# Patient Record
Sex: MALE | Race: BLACK OR AFRICAN AMERICAN | Employment: UNEMPLOYED | URBAN - METROPOLITAN AREA
[De-identification: names, ages, dates, MRNs, and addresses within clinical notes are randomized per-mention and may not be internally consistent; named-entity substitution may affect disease eponyms.]

---

## 2017-02-02 ENCOUNTER — OP HISTORICAL/CONVERTED ENCOUNTER (OUTPATIENT)
Dept: OTHER | Age: 62
End: 2017-02-02

## 2017-02-16 ENCOUNTER — OP HISTORICAL/CONVERTED ENCOUNTER (OUTPATIENT)
Dept: OTHER | Age: 62
End: 2017-02-16

## 2017-02-22 ENCOUNTER — ED HISTORICAL/CONVERTED ENCOUNTER (OUTPATIENT)
Dept: OTHER | Age: 62
End: 2017-02-22

## 2017-06-06 ENCOUNTER — OP HISTORICAL/CONVERTED ENCOUNTER (OUTPATIENT)
Dept: OTHER | Age: 62
End: 2017-06-06

## 2017-06-14 ENCOUNTER — OP HISTORICAL/CONVERTED ENCOUNTER (OUTPATIENT)
Dept: OTHER | Age: 62
End: 2017-06-14

## 2017-08-24 ENCOUNTER — OP HISTORICAL/CONVERTED ENCOUNTER (OUTPATIENT)
Dept: OTHER | Age: 62
End: 2017-08-24

## 2017-10-12 ENCOUNTER — OP HISTORICAL/CONVERTED ENCOUNTER (OUTPATIENT)
Dept: OTHER | Age: 62
End: 2017-10-12

## 2017-10-13 ENCOUNTER — OP HISTORICAL/CONVERTED ENCOUNTER (OUTPATIENT)
Dept: OTHER | Age: 62
End: 2017-10-13

## 2017-11-26 ENCOUNTER — IP HISTORICAL/CONVERTED ENCOUNTER (OUTPATIENT)
Dept: OTHER | Age: 62
End: 2017-11-26

## 2017-12-08 ENCOUNTER — OP HISTORICAL/CONVERTED ENCOUNTER (OUTPATIENT)
Dept: OTHER | Age: 62
End: 2017-12-08

## 2017-12-28 ENCOUNTER — OP HISTORICAL/CONVERTED ENCOUNTER (OUTPATIENT)
Dept: OTHER | Age: 62
End: 2017-12-28

## 2017-12-29 ENCOUNTER — IP HISTORICAL/CONVERTED ENCOUNTER (OUTPATIENT)
Dept: OTHER | Age: 62
End: 2017-12-29

## 2018-01-09 ENCOUNTER — OP HISTORICAL/CONVERTED ENCOUNTER (OUTPATIENT)
Dept: OTHER | Age: 63
End: 2018-01-09

## 2018-01-10 ENCOUNTER — ED HISTORICAL/CONVERTED ENCOUNTER (OUTPATIENT)
Dept: OTHER | Age: 63
End: 2018-01-10

## 2018-01-19 ENCOUNTER — OP HISTORICAL/CONVERTED ENCOUNTER (OUTPATIENT)
Dept: OTHER | Age: 63
End: 2018-01-19

## 2018-03-16 ENCOUNTER — OP HISTORICAL/CONVERTED ENCOUNTER (OUTPATIENT)
Dept: OTHER | Age: 63
End: 2018-03-16

## 2018-03-25 ENCOUNTER — OP HISTORICAL/CONVERTED ENCOUNTER (OUTPATIENT)
Dept: OTHER | Age: 63
End: 2018-03-25

## 2018-04-05 ENCOUNTER — OP HISTORICAL/CONVERTED ENCOUNTER (OUTPATIENT)
Dept: OTHER | Age: 63
End: 2018-04-05

## 2018-04-13 ENCOUNTER — OP HISTORICAL/CONVERTED ENCOUNTER (OUTPATIENT)
Dept: OTHER | Age: 63
End: 2018-04-13

## 2018-04-14 ENCOUNTER — ED HISTORICAL/CONVERTED ENCOUNTER (OUTPATIENT)
Dept: OTHER | Age: 63
End: 2018-04-14

## 2018-04-20 ENCOUNTER — OP HISTORICAL/CONVERTED ENCOUNTER (OUTPATIENT)
Dept: OTHER | Age: 63
End: 2018-04-20

## 2018-04-27 ENCOUNTER — OP HISTORICAL/CONVERTED ENCOUNTER (OUTPATIENT)
Dept: OTHER | Age: 63
End: 2018-04-27

## 2018-05-18 ENCOUNTER — OP HISTORICAL/CONVERTED ENCOUNTER (OUTPATIENT)
Dept: OTHER | Age: 63
End: 2018-05-18

## 2018-06-15 ENCOUNTER — IP HISTORICAL/CONVERTED ENCOUNTER (OUTPATIENT)
Dept: OTHER | Age: 63
End: 2018-06-15

## 2018-06-15 ENCOUNTER — OP HISTORICAL/CONVERTED ENCOUNTER (OUTPATIENT)
Dept: OTHER | Age: 63
End: 2018-06-15

## 2018-07-06 ENCOUNTER — OP HISTORICAL/CONVERTED ENCOUNTER (OUTPATIENT)
Dept: OTHER | Age: 63
End: 2018-07-06

## 2018-07-09 ENCOUNTER — OP HISTORICAL/CONVERTED ENCOUNTER (OUTPATIENT)
Dept: OTHER | Age: 63
End: 2018-07-09

## 2018-08-13 ENCOUNTER — OP HISTORICAL/CONVERTED ENCOUNTER (OUTPATIENT)
Dept: OTHER | Age: 63
End: 2018-08-13

## 2018-10-25 ENCOUNTER — IP HISTORICAL/CONVERTED ENCOUNTER (OUTPATIENT)
Dept: OTHER | Age: 63
End: 2018-10-25

## 2019-04-22 ENCOUNTER — ED HISTORICAL/CONVERTED ENCOUNTER (OUTPATIENT)
Dept: OTHER | Age: 64
End: 2019-04-22

## 2019-07-06 ENCOUNTER — ED HISTORICAL/CONVERTED ENCOUNTER (OUTPATIENT)
Dept: OTHER | Age: 64
End: 2019-07-06

## 2020-02-03 ENCOUNTER — ED HISTORICAL/CONVERTED ENCOUNTER (OUTPATIENT)
Dept: OTHER | Age: 65
End: 2020-02-03

## 2020-02-23 ENCOUNTER — IP HISTORICAL/CONVERTED ENCOUNTER (OUTPATIENT)
Dept: OTHER | Age: 65
End: 2020-02-23

## 2020-05-14 ENCOUNTER — ED HISTORICAL/CONVERTED ENCOUNTER (OUTPATIENT)
Dept: OTHER | Age: 65
End: 2020-05-14

## 2020-06-09 ENCOUNTER — ED HISTORICAL/CONVERTED ENCOUNTER (OUTPATIENT)
Dept: OTHER | Age: 65
End: 2020-06-09

## 2021-07-31 ENCOUNTER — APPOINTMENT (OUTPATIENT)
Dept: GENERAL RADIOLOGY | Age: 66
End: 2021-07-31
Attending: HOSPITALIST
Payer: COMMERCIAL

## 2021-07-31 ENCOUNTER — HOSPITAL ENCOUNTER (OUTPATIENT)
Age: 66
Setting detail: OBSERVATION
Discharge: HOME | End: 2021-08-01
Attending: EMERGENCY MEDICINE | Admitting: INTERNAL MEDICINE
Payer: COMMERCIAL

## 2021-07-31 DIAGNOSIS — Z79.01 ANTICOAGULATED ON WARFARIN: ICD-10-CM

## 2021-07-31 DIAGNOSIS — R00.1 AV JUNCTIONAL BRADYCARDIA: Primary | ICD-10-CM

## 2021-07-31 DIAGNOSIS — Z95.2 HISTORY OF MITRAL VALVE REPLACEMENT: ICD-10-CM

## 2021-07-31 LAB
ALBUMIN SERPL-MCNC: 3.2 G/DL (ref 3.5–5)
ALBUMIN/GLOB SERPL: 0.6 {RATIO} (ref 1.1–2.2)
ALP SERPL-CCNC: 80 U/L (ref 45–117)
ALT SERPL-CCNC: 26 U/L (ref 12–78)
ANION GAP SERPL CALC-SCNC: 1 MMOL/L (ref 5–15)
AST SERPL W P-5'-P-CCNC: 29 U/L (ref 15–37)
BASOPHILS # BLD: 0.1 K/UL (ref 0–0.1)
BASOPHILS NFR BLD: 1 % (ref 0–1)
BILIRUB SERPL-MCNC: 0.9 MG/DL (ref 0.2–1)
BUN SERPL-MCNC: 35 MG/DL (ref 6–20)
BUN/CREAT SERPL: 17 (ref 12–20)
CA-I BLD-MCNC: 9 MG/DL (ref 8.5–10.1)
CHLORIDE SERPL-SCNC: 110 MMOL/L (ref 97–108)
CO2 SERPL-SCNC: 28 MMOL/L (ref 21–32)
CREAT SERPL-MCNC: 2.03 MG/DL (ref 0.7–1.3)
DIFFERENTIAL METHOD BLD: ABNORMAL
EOSINOPHIL # BLD: 0.3 K/UL (ref 0–0.4)
EOSINOPHIL NFR BLD: 5 % (ref 0–7)
ERYTHROCYTE [DISTWIDTH] IN BLOOD BY AUTOMATED COUNT: 16 % (ref 11.5–14.5)
GLOBULIN SER CALC-MCNC: 5 G/DL (ref 2–4)
GLUCOSE SERPL-MCNC: 83 MG/DL (ref 65–100)
HCT VFR BLD AUTO: 39.2 % (ref 36.6–50.3)
HGB BLD-MCNC: 11.9 G/DL (ref 12.1–17)
IMM GRANULOCYTES # BLD AUTO: 0 K/UL (ref 0–0.04)
IMM GRANULOCYTES NFR BLD AUTO: 0 % (ref 0–0.5)
INR PPP: 1.9 (ref 0.9–1.1)
LYMPHOCYTES # BLD: 1.1 K/UL (ref 0.8–3.5)
LYMPHOCYTES NFR BLD: 20 % (ref 12–49)
MAGNESIUM SERPL-MCNC: 1.9 MG/DL (ref 1.6–2.4)
MCH RBC QN AUTO: 27.5 PG (ref 26–34)
MCHC RBC AUTO-ENTMCNC: 30.4 G/DL (ref 30–36.5)
MCV RBC AUTO: 90.7 FL (ref 80–99)
MONOCYTES # BLD: 0.6 K/UL (ref 0–1)
MONOCYTES NFR BLD: 12 % (ref 5–13)
NEUTS SEG # BLD: 3.3 K/UL (ref 1.8–8)
NEUTS SEG NFR BLD: 62 % (ref 32–75)
NRBC # BLD: 0 K/UL (ref 0–0.01)
NRBC BLD-RTO: 0 PER 100 WBC
PHOSPHATE SERPL-MCNC: 3 MG/DL (ref 2.6–4.7)
PLATELET # BLD AUTO: 206 K/UL (ref 150–400)
PMV BLD AUTO: 12.6 FL (ref 8.9–12.9)
POTASSIUM SERPL-SCNC: 5.7 MMOL/L (ref 3.5–5.1)
PROT SERPL-MCNC: 8.2 G/DL (ref 6.4–8.2)
PROTHROMBIN TIME: 21.1 SEC (ref 11.9–14.7)
RBC # BLD AUTO: 4.32 M/UL (ref 4.1–5.7)
SODIUM SERPL-SCNC: 139 MMOL/L (ref 136–145)
TROPONIN I SERPL-MCNC: <0.05 NG/ML
TROPONIN I SERPL-MCNC: <0.05 NG/ML
WBC # BLD AUTO: 5.3 K/UL (ref 4.1–11.1)

## 2021-07-31 PROCEDURE — 74011250636 HC RX REV CODE- 250/636: Performed by: HOSPITALIST

## 2021-07-31 PROCEDURE — 73630 X-RAY EXAM OF FOOT: CPT

## 2021-07-31 PROCEDURE — 85610 PROTHROMBIN TIME: CPT

## 2021-07-31 PROCEDURE — 80053 COMPREHEN METABOLIC PANEL: CPT

## 2021-07-31 PROCEDURE — 93005 ELECTROCARDIOGRAM TRACING: CPT

## 2021-07-31 PROCEDURE — 65270000029 HC RM PRIVATE

## 2021-07-31 PROCEDURE — 99285 EMERGENCY DEPT VISIT HI MDM: CPT

## 2021-07-31 PROCEDURE — 36415 COLL VENOUS BLD VENIPUNCTURE: CPT

## 2021-07-31 PROCEDURE — 83735 ASSAY OF MAGNESIUM: CPT

## 2021-07-31 PROCEDURE — 74011250637 HC RX REV CODE- 250/637: Performed by: HOSPITALIST

## 2021-07-31 PROCEDURE — 84484 ASSAY OF TROPONIN QUANT: CPT

## 2021-07-31 PROCEDURE — 99218 HC RM OBSERVATION: CPT

## 2021-07-31 PROCEDURE — 85025 COMPLETE CBC W/AUTO DIFF WBC: CPT

## 2021-07-31 PROCEDURE — 84100 ASSAY OF PHOSPHORUS: CPT

## 2021-07-31 RX ORDER — ATORVASTATIN CALCIUM 40 MG/1
40 TABLET, FILM COATED ORAL
Status: DISCONTINUED | OUTPATIENT
Start: 2021-07-31 | End: 2021-08-01 | Stop reason: HOSPADM

## 2021-07-31 RX ORDER — FOLIC ACID 1 MG/1
1 TABLET ORAL DAILY
Status: DISCONTINUED | OUTPATIENT
Start: 2021-08-01 | End: 2021-08-01 | Stop reason: HOSPADM

## 2021-07-31 RX ORDER — CITALOPRAM 10 MG/1
10 TABLET ORAL AS NEEDED
Status: DISCONTINUED | OUTPATIENT
Start: 2021-07-31 | End: 2021-07-31

## 2021-07-31 RX ORDER — WARFARIN 2.5 MG/1
7.5 TABLET ORAL ONCE
Status: COMPLETED | OUTPATIENT
Start: 2021-07-31 | End: 2021-07-31

## 2021-07-31 RX ORDER — FLUTICASONE PROPIONATE 50 MCG
2 SPRAY, SUSPENSION (ML) NASAL DAILY
Status: DISCONTINUED | OUTPATIENT
Start: 2021-08-01 | End: 2021-08-01 | Stop reason: HOSPADM

## 2021-07-31 RX ORDER — PANTOPRAZOLE SODIUM 40 MG/1
40 TABLET, DELAYED RELEASE ORAL DAILY
Status: DISCONTINUED | OUTPATIENT
Start: 2021-08-01 | End: 2021-08-01 | Stop reason: HOSPADM

## 2021-07-31 RX ORDER — ACETAMINOPHEN 325 MG/1
650 TABLET ORAL
Status: DISCONTINUED | OUTPATIENT
Start: 2021-07-31 | End: 2021-08-01 | Stop reason: HOSPADM

## 2021-07-31 RX ORDER — SODIUM POLYSTYRENE SULFONATE 15 G/60ML
15 SUSPENSION ORAL; RECTAL
Status: COMPLETED | OUTPATIENT
Start: 2021-07-31 | End: 2021-07-31

## 2021-07-31 RX ORDER — ROSUVASTATIN CALCIUM 5 MG/1
20 TABLET, COATED ORAL
Status: DISCONTINUED | OUTPATIENT
Start: 2021-07-31 | End: 2021-07-31 | Stop reason: CLARIF

## 2021-07-31 RX ORDER — SUMATRIPTAN 25 MG/1
100 TABLET, FILM COATED ORAL
Status: DISCONTINUED | OUTPATIENT
Start: 2021-07-31 | End: 2021-08-01 | Stop reason: HOSPADM

## 2021-07-31 RX ADMIN — WARFARIN SODIUM 7.5 MG: 2.5 TABLET ORAL at 19:01

## 2021-07-31 RX ADMIN — SODIUM CHLORIDE 500 ML: 9 INJECTION, SOLUTION INTRAVENOUS at 19:03

## 2021-07-31 RX ADMIN — SODIUM POLYSTYRENE SULFONATE 15 G: 15 SUSPENSION ORAL; RECTAL at 17:29

## 2021-07-31 NOTE — ED TRIAGE NOTES
Per EMS, [facility] and while taking vitals his heart rate was in the 30's. No history of bradycradia. Pt denies any other complaints.

## 2021-07-31 NOTE — ED NOTES
TRANSFER - OUT REPORT:    Verbal report given to [facility] (name) on [facility]  being transferred to [facility](unit) for routine progression of care       Report consisted of patients Situation, Background, Assessment and   Recommendations(SBAR). Information from the following report(s) SBAR was reviewed with the receiving nurse. Lines:   Peripheral IV 07/31/21 Right Antecubital (Active)        Opportunity for questions and clarification was provided.       Patient transported with:   [employer]

## 2021-07-31 NOTE — H&P
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 [facility] and [facility]. He states his Xray results were confusing and there was mixed consensus whether there was a fracture or not    Any how,    [facility] 30s and presented here. EKG shows junctional rhythm, no symptoms of chest pain, shortness of breath, or syncopal episode, or diaphoresis. ER: troponin negative x1, junctional rhythm  Chronically he is on warfarin - d/t to strep IE. Very active life style    Denies any urine changes, diarrhea, headache,     No past medical history on file. Past Surgical History:   Procedure Laterality Date    HX COLONOSCOPY  10/2014    HX ENDOSCOPY  12/1/2014     Family History   Problem Relation Age of Onset    Liver Disease Father       Social History     Tobacco Use    Smoking status: Heavy Tobacco Smoker     Packs/day: 1.50     Years: 15.00     Pack years: 22.50    Smokeless tobacco: Never Used   Substance Use Topics    Alcohol use: Yes     Alcohol/week: 4.2 standard drinks     Types: 5 Cans of beer per week       Prior to Admission medications    Medication Sig Start Date End Date Taking? Authorizing Provider   warfarin (COUMADIN) 1 mg tablet Take 1 tab daily, except 2 tabs on Tuesday. Must make appt, no refills. 7/8/15   Aura Vaz MD   thiamine (B-1) 100 mg tablet Take 1 tablet by mouth daily. For supplementation 1/12/15   Aura Vaz MD   folic acid (FOLVITE) 1 mg tablet Take 1 tablet by mouth daily. For supplementation 1/12/15   Pa Vaz MD   pantoprazole (PROTONIX) 40 mg tablet Take 40 mg by mouth daily. Provider, Historical   valsartan (DIOVAN) 160 mg tablet Take 1 tablet by mouth daily. S/p valve replacement 1/2/15   Pa Vaz MD   metoprolol succinate (TOPROL-XL) 25 mg XL tablet Take 1 tablet by mouth daily. S/p valve replacement 1/2/15   Pa Vaz MD   omeprazole (PRILOSEC) 20 mg capsule Take 1 capsule by mouth daily. For stomach acid 12/31/14   Pa Vaz MD   rosuvastatin (CRESTOR) 10 mg tablet Take 20 mg by mouth nightly. Provider, Historical   SUMAtriptan (IMITREX) 100 mg tablet Take 100 mg by mouth once as needed for Migraine. Provider, Historical   citalopram (CELEXA) 10 mg tablet Take  by mouth as needed. Provider, Historical   fluticasone (FLONASE) 50 mcg/actuation nasal spray Use two sprays in each nostril once daily for allergies. 12/30/14   Pa Vaz MD     No Known Allergies          Review of Systems:  Constitutional: Appetite is good, denies weight loss, no fever, no chills, no night sweats  Eye: No recent visual disturbances, no discharge, no double vision  Ear/nose/mouth/throat : No hearing disturbance, no ear pain, no nasal congestion, no sore throat, no trouble swallowing. Respiratory : No trouble breathing, no cough, no shortness of breath, no hemoptysis, no wheezing  Cardiovascular : No chest pain, no palpitation, no racing of heart, no orthopnea, no paroxysmal nocturnal dyspnea, no peripheral edema  Gastrointestinal : No nausea, no vomiting, no diarrhea, constipation, heartburn, abdominal pain  Genitourinary : No dysuria, no hematuria, no increased frequency, incontinence,  Lymphatics : No swollen glands -Neck, axillary, inguinal  Endocrine : No excessive thirst, no polyuria no cold intolerance, no heat intolerance.   Immunologic : No hives, urticaria, no seasonal allergies,   Musculoskeletal : L- foot pain  Integumentary : No rash, no pruritus, no ecchymosis  Hematology : No petechiae, No easy bruising,  No tendency to bleed easy  Neurology : Denies change in mental status, no abnormal balance, no headache, no confusion, numbness, tingling,  Psychiatric : No mood swings, no anxiety, depression    Vitals:     Visit Vitals  /80 (BP 1 Location: Left upper arm, BP Patient Position: At rest)   Pulse (!) 41   Temp 97.7 °F (36.5 °C)   Resp 16   Ht 5' 7\" (1.702 m)   Wt 94.8 kg (209 lb)   SpO2 100%   BMI 32.73 kg/m²       Physical Exam:   General : Appearance, looks comfortable, no respiratory distress noted  HEENT : PERRLA, normal oral mucosa, atraumatic normocephalic, Normal ear and nose. oropharynx  Neck : Supple, no JVD, no masses noted, no carotid bruit  Lungs : Breath sounds with good air entry bilaterally, no wheezes or rales, no accessory muscle use,  CVS : bradycardia  Abdomen : Soft, nontender, no organomegaly, bowel sounds active  Extremities : No edema noted,  pedal pulses palpable  Musculoskeletal : Fair range of motion, no joint swelling or effusion, muscle tone and power appears normal,   Skin : Moist, warm, skin turgor, no pathological rash  Lymphatic : No cervical lymphadenopathy. Neurological : Awake, alert, oriented to time place person. Cranial nerves intact, deep tendon reflexes active, no sensory disturbance, no cerebellar deficits. Psychiatric : Mood and affect appears appropriate to the situation.          Data Review:   Recent Results (from the past 24 hour(s))   CBC WITH AUTOMATED DIFF    Collection Time: 07/31/21 11:03 AM   Result Value Ref Range    WBC 5.3 4.1 - 11.1 K/uL    RBC 4.32 4.10 - 5.70 M/uL    HGB 11.9 (L) 12.1 - 17.0 g/dL    HCT 39.2 36.6 - 50.3 %    MCV 90.7 80.0 - 99.0 FL    MCH 27.5 26.0 - 34.0 PG    MCHC 30.4 30.0 - 36.5 g/dL    RDW 16.0 (H) 11.5 - 14.5 %    PLATELET 958 422 - 698 K/uL    MPV 12.6 8.9 - 12.9 FL    NRBC 0.0 0.0  WBC    ABSOLUTE NRBC 0.00 0.00 - 0.01 K/uL    NEUTROPHILS 62 32 - 75 %    LYMPHOCYTES 20 12 - 49 %    MONOCYTES 12 5 - 13 %    EOSINOPHILS 5 0 - 7 %    BASOPHILS 1 0 - 1 %    IMMATURE GRANULOCYTES 0 0 - 0.5 %    ABS. NEUTROPHILS 3.3 1.8 - 8.0 K/UL    ABS. LYMPHOCYTES 1.1 0.8 - 3.5 K/UL    ABS. MONOCYTES 0.6 0.0 - 1.0 K/UL    ABS. EOSINOPHILS 0.3 0.0 - 0.4 K/UL    ABS. BASOPHILS 0.1 0.0 - 0.1 K/UL    ABS. IMM. GRANS. 0.0 0.00 - 0.04 K/UL    DF AUTOMATED     MAGNESIUM    Collection Time: 07/31/21 12:33 PM   Result Value Ref Range    Magnesium 1.9 1.6 - 2.4 mg/dL   METABOLIC PANEL, COMPREHENSIVE    Collection Time: 07/31/21 12:33 PM   Result Value Ref Range    Sodium 139 136 - 145 mmol/L    Potassium 5.7 (H) 3.5 - 5.1 mmol/L    Chloride 110 (H) 97 - 108 mmol/L    CO2 28 21 - 32 mmol/L    Anion gap 1 (L) 5 - 15 mmol/L    Glucose 83 65 - 100 mg/dL    BUN 35 (H) 6 - 20 mg/dL    Creatinine 2.03 (H) 0.70 - 1.30 mg/dL    BUN/Creatinine ratio 17 12 - 20      GFR est AA 40 (L) >60 ml/min/1.73m2    GFR est non-AA 33 (L) >60 ml/min/1.73m2    Calcium 9.0 8.5 - 10.1 mg/dL    Bilirubin, total 0.9 0.2 - 1.0 mg/dL    AST (SGOT) 29 15 - 37 U/L    ALT (SGPT) 26 12 - 78 U/L    Alk. phosphatase 80 45 - 117 U/L    Protein, total 8.2 6.4 - 8.2 g/dL    Albumin 3.2 (L) 3.5 - 5.0 g/dL    Globulin 5.0 (H) 2.0 - 4.0 g/dL    A-G Ratio 0.6 (L) 1.1 - 2.2     PHOSPHORUS    Collection Time: 07/31/21 12:33 PM   Result Value Ref Range    Phosphorus 3.0 2.6 - 4.7 mg/dL   TROPONIN I    Collection Time: 07/31/21 12:33 PM   Result Value Ref Range    Troponin-I, Qt. <0.05 <0.05 ng/mL             Assessment and Plan :     (1) bradycardia: troponin -ve x1, EKG junctional rhythm, hold metoprolol and valsartan, IVF bolus and k-xylate one time, PRN atropine as standby. ECHO for akinesia and cardiology consult    (2) MVR d/t strep IE on warfarin: complicates #1    (3) hyperkalemia: likely dehydration with ACE use.  See #1    (4) MONIKA: see #1    (5) GERD: protonix    (6) HLD: statin    (7) left greater toe pain s/t injury: XR    DVT ppx: warfarin    DISPO: home after cardiology in [place].       Signed By: Luann Liang MD     July 31, 2021

## 2021-08-01 ENCOUNTER — APPOINTMENT (OUTPATIENT)
Dept: NON INVASIVE DIAGNOSTICS | Age: 66
End: 2021-08-01
Attending: HOSPITALIST
Payer: COMMERCIAL

## 2021-08-01 VITALS
HEART RATE: 52 BPM | BODY MASS INDEX: 32.8 KG/M2 | SYSTOLIC BLOOD PRESSURE: 155 MMHG | TEMPERATURE: 97.6 F | RESPIRATION RATE: 18 BRPM | DIASTOLIC BLOOD PRESSURE: 62 MMHG | HEIGHT: 67 IN | OXYGEN SATURATION: 99 % | WEIGHT: 209 LBS

## 2021-08-01 LAB
ANION GAP SERPL CALC-SCNC: 1 MMOL/L (ref 5–15)
ATRIAL RATE: 312 BPM
ATRIAL RATE: 47 BPM
BUN SERPL-MCNC: 37 MG/DL (ref 6–20)
BUN/CREAT SERPL: 22 (ref 12–20)
CA-I BLD-MCNC: 8.8 MG/DL (ref 8.5–10.1)
CALCULATED R AXIS, ECG10: -55 DEGREES
CALCULATED R AXIS, ECG10: 3 DEGREES
CALCULATED T AXIS, ECG11: 39 DEGREES
CALCULATED T AXIS, ECG11: 73 DEGREES
CHLORIDE SERPL-SCNC: 110 MMOL/L (ref 97–108)
CO2 SERPL-SCNC: 27 MMOL/L (ref 21–32)
CREAT SERPL-MCNC: 1.7 MG/DL (ref 0.7–1.3)
DIAGNOSIS, 93000: NORMAL
DIAGNOSIS, 93000: NORMAL
ECHO AO ROOT DIAM: 3.4 CM
ECHO AR MAX VEL PISA: 323 CM/S
ECHO AV AREA PEAK VELOCITY: 2.16 CM2
ECHO AV AREA VTI: 2.1 CM2
ECHO AV AREA/BSA PEAK VELOCITY: 1 CM2/M2
ECHO AV AREA/BSA VTI: 1 CM2/M2
ECHO AV MEAN GRADIENT: 4 MMHG
ECHO AV MEAN VELOCITY: 95.2 CM/S
ECHO AV PEAK GRADIENT: 11 MMHG
ECHO AV PEAK VELOCITY: 164 CM/S
ECHO AV REGURGITANT PHT: 513 MS
ECHO AV VTI: 30.3 CM
ECHO LA AREA 4C: 42.55 CM2
ECHO LA MAJOR AXIS: 5.8 CM
ECHO LA MINOR AXIS: 2.82 CM
ECHO LA TO AORTIC ROOT RATIO: 1.71
ECHO LV EDV A2C: 96.1 CM3
ECHO LV ESV A2C: 26.5 CM3
ECHO LV INTERNAL DIMENSION DIASTOLIC: 4.58 CM (ref 4.2–5.9)
ECHO LV INTERNAL DIMENSION SYSTOLIC: 2.98 CM
ECHO LV IVSD: 1.15 CM (ref 0.6–1)
ECHO LV MASS 2D: 189.3 G (ref 88–224)
ECHO LV MASS INDEX 2D: 91.9 G/M2 (ref 49–115)
ECHO LV POSTERIOR WALL DIASTOLIC: 1.13 CM (ref 0.6–1)
ECHO LVOT DIAM: 1.9 CM
ECHO LVOT PEAK GRADIENT: 6 MMHG
ECHO LVOT PEAK VELOCITY: 125 CM/S
ECHO LVOT SV: 64 CM3
ECHO LVOT VTI: 22.4 CM
ECHO LVOT VTI: 22.4 CM
ECHO MAIN PULMONARY ARTERY DIAMETER: 2.7 CM
ECHO MV E DECELERATION TIME (DT): 158 MS
ECHO MV E VELOCITY: 205 CM/S
ECHO MV MAX VELOCITY: 214 CM/S
ECHO MV MEAN GRADIENT: 6 MMHG
ECHO MV PEAK GRADIENT: 18 MMHG
ECHO MV VTI: 40.4 CM
ECHO PV MAX VELOCITY: 95.4 CM/S
ECHO PV PEAK INSTANTANEOUS GRADIENT SYSTOLIC: 4 MMHG
ECHO PV PEAK INSTANTANEOUS GRADIENT SYSTOLIC: 5 MMHG
ECHO PV REGURGITANT MAX VELOCITY: 115 CM/S
ECHO RA AREA 4C: 28.44 CM2
ECHO RV INTERNAL DIMENSION: 4.45 CM
ECHO RV TAPSE: 1.7 CM (ref 1.5–2)
ECHO TV MAX VELOCITY: 326 CM/S
ECHO TV REGURGITANT PEAK GRADIENT: 43 MMHG
GLUCOSE SERPL-MCNC: 100 MG/DL (ref 65–100)
INR PPP: 2.2 (ref 0.9–1.1)
LVOT MG: 2 MMHG
POTASSIUM SERPL-SCNC: 4.2 MMOL/L (ref 3.5–5.1)
PROTHROMBIN TIME: 23.9 SEC (ref 11.9–14.7)
Q-T INTERVAL, ECG07: 476 MS
Q-T INTERVAL, ECG07: 500 MS
QRS DURATION, ECG06: 104 MS
QRS DURATION, ECG06: 96 MS
QTC CALCULATION (BEZET), ECG08: 402 MS
QTC CALCULATION (BEZET), ECG08: 429 MS
SODIUM SERPL-SCNC: 138 MMOL/L (ref 136–145)
VENTRICULAR RATE, ECG03: 39 BPM
VENTRICULAR RATE, ECG03: 49 BPM

## 2021-08-01 PROCEDURE — 85610 PROTHROMBIN TIME: CPT

## 2021-08-01 PROCEDURE — 80048 BASIC METABOLIC PNL TOTAL CA: CPT

## 2021-08-01 PROCEDURE — 74011250637 HC RX REV CODE- 250/637: Performed by: HOSPITALIST

## 2021-08-01 PROCEDURE — 36415 COLL VENOUS BLD VENIPUNCTURE: CPT

## 2021-08-01 PROCEDURE — 99218 HC RM OBSERVATION: CPT

## 2021-08-01 PROCEDURE — 93005 ELECTROCARDIOGRAM TRACING: CPT

## 2021-08-01 PROCEDURE — 93306 TTE W/DOPPLER COMPLETE: CPT

## 2021-08-01 RX ORDER — NAPROXEN 500 MG/1
500 TABLET ORAL DAILY
COMMUNITY

## 2021-08-01 RX ORDER — WARFARIN 7.5 MG/1
7.5 TABLET ORAL
COMMUNITY

## 2021-08-01 RX ORDER — FLUTICASONE PROPIONATE AND SALMETEROL 250; 50 UG/1; UG/1
2 POWDER RESPIRATORY (INHALATION) DAILY
COMMUNITY

## 2021-08-01 RX ORDER — SPIRONOLACTONE 25 MG/1
25 TABLET ORAL DAILY
COMMUNITY

## 2021-08-01 RX ADMIN — FOLIC ACID 1 MG: 1 TABLET ORAL at 11:31

## 2021-08-01 RX ADMIN — ATORVASTATIN CALCIUM 40 MG: 40 TABLET, FILM COATED ORAL at 00:54

## 2021-08-01 RX ADMIN — PANTOPRAZOLE SODIUM 40 MG: 40 TABLET, DELAYED RELEASE ORAL at 11:31

## 2021-08-01 NOTE — CONSULTS
[facility] CARDIOLOGY  CARDIOLOGY CONSULTATION    REASON FOR CONSULT:  bradycardia    REQUESTING PROVIDER:  See chart    CHIEF COMPLAINT:  Low HR    HISTORY OF PRESENT ILLNESS:    65M with h/o infective endocarditis s/p mechanical MVR on Coumadin, former smoker, HTN, CKD 3GERD, who presented after stubbing hit toe sliding into base while playing baseball. He works as a cook at [employer]. In the ER he was found to be bradycardiac to high 30s. Blood pressure wnl. He denies symptoms of fatigue, lightheadedness, syncope, or pre-syncope. He has no h/o cp, sob, orthopnea. Pnd, or le swelling. He is fairly active and has no issues with activity. He does not feel palpitations. He has no bleeding issues. ROS is otherwise negative. Records from hospital admission course thus far reviewed. Since admission, patient has had ECG and TTE, see below. PAST MEDICAL HISTORY:  Notes above. Relevant cardiac issues: see above    HOME MEDICATIONS:  Home medications reviewed, no side effects. ALLERGIES:  Allergies reviewed with the patient,JACKIE. FAMILY HISTORY:  Family history reviewed, no premature cardiac disease. SOCIAL HISTORY:  Notable for prior 22pack year tobacco use, no heavy alcohol or illicit drug use. REVIEW OF SYSTEMS:  Complete review of systems performed, pertinents noted above, all other systems are negative. PHYSICAL EXAMINATION:    Visit Vitals  BP (!) 155/62 (BP 1 Location: Left upper arm, BP Patient Position: At rest;Supine)   Pulse (!) 52   Temp 97.6 °F (36.4 °C)   Resp 18   Ht 5' 7\" (1.702 m)   Wt 94.8 kg (209 lb)   SpO2 99%   BMI 32.73 kg/m²     Irregular rhythm, mild bradycardia which increases with activity  Mechanical S1, no rubs, no gallops  CTAB  Soft nt nd  No le edema, 2+ pulses    Electrocardiogram performed earlier reviewed, it shows Atrial fibrillation, Rate high 40s    Recent labs results and imaging reviewed.   Notable findings include  INR 2.2  Cr 1.7, GFR 50    TTE  · LV: Estimated LVEF is 60 - 65%. Normal cavity size, wall thickness and systolic function (ejection fraction normal). · MV: Mitral valve is prosthetic. Mitral valve mean gradient is 6 mmHg. · PV: Mild pulmonic valve regurgitation is present. · LA: Severely dilated left atrium. · RA: Mildly dilated right atrium. · TV: Right Ventricular Arterial Pressure (RVSP) is 43 mmHg. · RV: Mildly dilated right ventricle. · AV: Mild aortic valve regurgitation is present. IMPRESSION AND RECOMMENDATIONS:  1. H/o MVR  2. Bradycardia  3. Atrial fibrillation  4. Hypertension    -Asymptomatic bradycardia. No evidence of chronotropic incompetance. - marco new onset Atrial fibrillation although douby given h/o MVR and severely dilated Left atrium  - Stop Metoprolol, follow up as outpatient with primary cardiologist for 14 day Zio patch or event monitor  - Cont Coumadin, goal INR 2.5-3.5 given mechanical MVR  - no further workup indicated as an inpatient  - Add Amlodipine for blood pressure control    Thank you for involving us in the care of this patient. Please do not hesitate to call if additional questions arise.     Jessica Renae MD, Charissa Rose, [facility]  Structural Heart Disease  Endovascular and Vascular Medicine  Interventional Cardiology  [facility]  [phone]

## 2021-08-01 NOTE — DISCHARGE SUMMARY
Hospitalist Discharge Summary     Patient ID:  Adelaide Boyle  [id number]  13 y.o.  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 [facility] and unna boot. He states his Xray results were confusing and there was mixed consensus whether there was a fracture or not     Any how,     [facility] was 30s and presented here. EKG shows junctional rhythm, no symptoms of chest pain, shortness of breath, or syncopal episode, or diaphoresis. ER: troponin negative x1, junctional rhythm  Chronically he is on warfarin - d/t to strep IE. Very active life style     Denies any urine changes, diarrhea, headache,        ______________________________________________________________________  DISCHARGE SUMMARY/HOSPITAL COURSE:  for full details see H&P, daily progress notes, labs, consult notes.    Patient was subsequently admitted to [facility] for further evaluation as well as management, patient remained on continuous telemetry monitoring, patient's beta-blockers were held, patient symptoms resolved, patient's heart rate normalized, patient was evaluated by cardiology and after cardiology clearance, patient was deemed stable for discharge with close outpatient follow-up with patient's own primary care physician as well as cardiologist, the plan was explained to the patient in detail who is agreeable to current plan.          _______________________________________________________________________  Patient seen and examined by me on discharge day. Pertinent Findings:  Gen:    Not in distress  Chest: Clear lungs  CVS:   Regular rhythm, s1/s2 no m/r/g  No edema  Abd:  Soft, not distended, not tender  Neuro:  Alert, Oriented x 4  _______________________________________________________________________  DISCHARGE MEDICATIONS:   Current Discharge Medication List      CONTINUE these medications which have NOT CHANGED    Details   !! warfarin (Coumadin) 7.5 mg tablet Take 7.5 mg by mouth every Tuesday, Thursday, Saturday & Sunday. fluticasone propion-salmeteroL (ADVAIR/WIXELA) 250-50 mcg/dose diskus inhaler Take 2 Puffs by inhalation daily. naproxen (Naprosyn) 500 mg tablet Take 500 mg by mouth daily. spironolactone (ALDACTONE) 25 mg tablet Take 25 mg by mouth daily. rosuvastatin (CRESTOR) 10 mg tablet Take 20 mg by mouth nightly. fluticasone (FLONASE) 50 mcg/actuation nasal spray Use two sprays in each nostril once daily for allergies. Qty: 1 Bottle, Refills: 2    Associated Diagnoses: Allergic rhinitis      !! warfarin (COUMADIN) 1 mg tablet Take 1 tab daily, except 2 tabs on Tuesday. Must make appt, no refills. Qty: 15 Tab, Refills: 0      thiamine (B-1) 100 mg tablet Take 1 tablet by mouth daily. For supplementation  Qty: 30 tablet, Refills: 11    Associated Diagnoses: Alcoholism /alcohol abuse      folic acid (FOLVITE) 1 mg tablet Take 1 tablet by mouth daily. For supplementation  Qty: 30 tablet, Refills: 11    Associated Diagnoses: Alcoholism /alcohol abuse      pantoprazole (PROTONIX) 40 mg tablet Take 40 mg by mouth daily. valsartan (DIOVAN) 160 mg tablet Take 1 tablet by mouth daily.  S/p valve replacement  Qty: 90 tablet, Refills: 1    Associated Diagnoses: H/O mitral valve replacement      SUMAtriptan (IMITREX) 100 mg tablet Take 100 mg by mouth once as needed for Migraine. citalopram (CELEXA) 10 mg tablet Take  by mouth as needed. !! - Potential duplicate medications found. Please discuss with provider. STOP taking these medications       metoprolol succinate (TOPROL-XL) 25 mg XL tablet Comments:   Reason for Stopping:         omeprazole (PRILOSEC) 20 mg capsule Comments:   Reason for Stopping:                 Patient Follow Up Instructions: Activity: Activity as tolerated  Diet: Cardiac Diet  Wound Care: As directed    Follow-up with PCP/Cardiology in 2 weeks. Follow-up tests/labs As per above physicians  Follow-up Information     Follow up With Specialties Details Why Contact Froilan Mcgill MD [place] [phone]          ________________________________________________________________    Risk of deterioration: Low    Condition at Discharge:  Stable  __________________________________________________________________    Disposition  Home with family, no needs    ____________________________________________________________________    Code Status: Full Code  ___________________________________________________________________      Total time in minutes spent coordinating this discharge (includes going over instructions, follow-up, prescriptions, and preparing report for sign off to her PCP) :  45 minutes    Signed:   Svitlana Chapman MD

## 2021-08-01 NOTE — PROGRESS NOTES
Pharmacy Dosing Services:      Day of Therapy 02  Dose to achieve an INR goal of 2.5- 3.5     INR today is 2.2     Order entered for  Warfarin  7.5 (mg) ordered to be given today at 18:00.

## 2021-08-01 NOTE — PROGRESS NOTES
Problem: Falls - Risk of  Goal: *Absence of Falls  Description: Document Murray Kristeno Fall Risk and appropriate interventions in the flowsheet.   Outcome: Progressing Towards Goal  Note: Fall Risk Interventions:            Medication Interventions: Teach patient to arise slowly                   Problem: Patient Education: Go to Patient Education Activity  Goal: Patient/Family Education  Outcome: Progressing Towards Goal

## 2021-08-08 NOTE — ED PROVIDER NOTES
EMERGENCY DEPARTMENT HISTORY AND PHYSICAL EXAM        Date: 7/31/2021  Patient Name: Burton Merchant    History of Presenting Illness     Chief Complaint   Patient presents with    Slow Heart Rate       3:01 PM    History Provided By: [facility] and [facility]    HPI: Burton Merchant, 61 y.o. male with a history of mitral valve replacement (metallic) on warfarin, HTN and migraine headaches presents to the ED and referral from [facility] secondary to new onset bradycardia at check-in. Patient was found to have a heart rate of 35 on check in vital signs which was confirmed on EKG is a heart rate of 39. Patient states that he has had no symptoms at all today and was quite pleasant this finding. Patient was being seen in follow-up for assessment of a possible foot fracture and a placed on a rebreather chest x-ray done there a few days ago. Patient denies any lightheadedness, dizziness, syncope/near syncope, chest pain, shortness of breath, nausea/vomiting/diarrhea, medication excess, medication changes. PCP: Chato Sotelo MD    Current Outpatient Medications   Medication Sig Dispense Refill    warfarin (Coumadin) 7.5 mg tablet Take 7.5 mg by mouth every Tuesday, Thursday, Saturday & Sunday.  fluticasone propion-salmeteroL (ADVAIR/WIXELA) 250-50 mcg/dose diskus inhaler Take 2 Puffs by inhalation daily.  naproxen (Naprosyn) 500 mg tablet Take 500 mg by mouth daily.  spironolactone (ALDACTONE) 25 mg tablet Take 25 mg by mouth daily.  rosuvastatin (CRESTOR) 10 mg tablet Take 20 mg by mouth nightly.  fluticasone (FLONASE) 50 mcg/actuation nasal spray Use two sprays in each nostril once daily for allergies. 1 Bottle 2    warfarin (COUMADIN) 1 mg tablet Take 1 tab daily, except 2 tabs on Tuesday. Must make appt, no refills.  (Patient taking differently: 2.5 mg every Monday, Wednesday, Friday.) 15 Tab 0    thiamine (B-1) 100 mg tablet Take 1 tablet by mouth daily. For supplementation (Patient not taking: Reported on 8/1/2021) 30 tablet 11    folic acid (FOLVITE) 1 mg tablet Take 1 tablet by mouth daily. For supplementation (Patient not taking: Reported on 8/1/2021) 30 tablet 11    pantoprazole (PROTONIX) 40 mg tablet Take 40 mg by mouth daily. (Patient not taking: Reported on 8/1/2021)      valsartan (DIOVAN) 160 mg tablet Take 1 tablet by mouth daily. S/p valve replacement (Patient not taking: Reported on 8/1/2021) 90 tablet 1    SUMAtriptan (IMITREX) 100 mg tablet Take 100 mg by mouth once as needed for Migraine. (Patient not taking: Reported on 8/1/2021)      citalopram (CELEXA) 10 mg tablet Take  by mouth as needed. (Patient not taking: Reported on 8/1/2021)         Past History     Past Medical History:  Past Medical History:   Diagnosis Date    Ill-defined condition     Mitral valve replaced 2006       Past Surgical History:  Past Surgical History:   Procedure Laterality Date    HX COLONOSCOPY  10/2014    HX ENDOSCOPY  12/1/2014       Family History:  Family History   Problem Relation Age of Onset    Liver Disease Father        Social History:  Social History     Tobacco Use    Smoking status: Heavy Tobacco Smoker     Packs/day: 1.50     Years: 15.00     Pack years: 22.50    Smokeless tobacco: Never Used   Vaping Use    Vaping Use: Every day   Substance Use Topics    Alcohol use: Yes     Alcohol/week: 4.2 standard drinks     Types: 5 Cans of beer per week    Drug use: No       Allergies:  No Known Allergies    Review of Systems   Review of Systems   Constitutional: Negative for chills, diaphoresis and fever. HENT: Negative for congestion, sore throat and trouble swallowing. Eyes: Negative for visual disturbance. Respiratory: Negative for cough and shortness of breath. Cardiovascular: Negative for chest pain and palpitations. Gastrointestinal: Negative for abdominal pain, diarrhea, nausea and vomiting. Endocrine: Negative for polydipsia, polyphagia and polyuria. Genitourinary: Negative for dysuria, frequency, hematuria and urgency. Musculoskeletal: Negative for gait problem and neck pain. Skin: Negative for rash. Neurological: Negative for dizziness, syncope, weakness, light-headedness and headaches. Psychiatric/Behavioral: Negative. Physical Exam   Physical Exam  Vitals and nursing note reviewed. Constitutional:       General: He is not in acute distress. Appearance: Normal appearance. He is well-developed. He is not ill-appearing. Comments: Very pleasant jovial -American male; GCS 15, appears to be quite healthy. HENT:      Head: Normocephalic and atraumatic. Nose: Nose normal.      Mouth/Throat:      Mouth: Mucous membranes are moist.      Pharynx: No posterior oropharyngeal erythema. Eyes:      General: Vision grossly intact. Extraocular Movements: Extraocular movements intact. Conjunctiva/sclera: Conjunctivae normal.      Pupils: Pupils are equal, round, and reactive to light. Neck:      Vascular: No JVD. Trachea: No tracheal deviation. Cardiovascular:      Rate and Rhythm: Regular rhythm. Bradycardia present. Pulses: Normal pulses. Carotid pulses are 2+ on the right side and 2+ on the left side. Radial pulses are 2+ on the right side and 2+ on the left side. Femoral pulses are 2+ on the right side and 2+ on the left side. Popliteal pulses are 2+ on the right side and 2+ on the left side. Dorsalis pedis pulses are 2+ on the right side and 2+ on the left side. Posterior tibial pulses are 2+ on the right side and 2+ on the left side. Heart sounds: Normal heart sounds. Comments: Well-healed midline sternotomy scar;  Valvular click is audible  Pulmonary:      Effort: Pulmonary effort is normal.      Breath sounds: Normal breath sounds and air entry. No wheezing or rhonchi.    Abdominal: General: Bowel sounds are normal.      Palpations: Abdomen is soft. Tenderness: There is no abdominal tenderness. There is no guarding or rebound. Musculoskeletal:         General: No swelling or deformity. Right shoulder: No swelling. Normal range of motion. Cervical back: Neck supple. Right lower leg: No edema. Left lower leg: No edema. Skin:     General: Skin is warm and dry. Capillary Refill: Capillary refill takes less than 2 seconds. Findings: No signs of injury or rash. Neurological:      General: No focal deficit present. Mental Status: He is alert and oriented to person, place, and time. Psychiatric:         Mood and Affect: Mood normal.         Behavior: Behavior normal. Behavior is cooperative. Thought Content: Thought content normal. Thought content does not include homicidal or suicidal ideation.          Judgment: Judgment normal.         Diagnostic Study Results     Labs -  Labs Reviewed   CBC WITH AUTOMATED DIFF - Abnormal; Notable for the following components:       Result Value    HGB 11.9 (*)     RDW 16.0 (*)     All other components within normal limits   METABOLIC PANEL, COMPREHENSIVE - Abnormal; Notable for the following components:    Potassium 5.7 (*)     Chloride 110 (*)     Anion gap 1 (*)     BUN 35 (*)     Creatinine 2.03 (*)     GFR est AA 40 (*)     GFR est non-AA 33 (*)     Albumin 3.2 (*)     Globulin 5.0 (*)     A-G Ratio 0.6 (*)     All other components within normal limits   PROTHROMBIN TIME + INR - Abnormal; Notable for the following components:    Prothrombin time 21.1 (*)     INR 1.9 (*)     All other components within normal limits   METABOLIC PANEL, BASIC - Abnormal; Notable for the following components:    Chloride 110 (*)     Anion gap 1 (*)     BUN 37 (*)     Creatinine 1.70 (*)     BUN/Creatinine ratio 22 (*)     GFR est AA 50 (*)     GFR est non-AA 41 (*)     All other components within normal limits   PROTHROMBIN TIME + INR - Abnormal; Notable for the following components:    Prothrombin time 23.9 (*)     INR 2.2 (*)     All other components within normal limits   MAGNESIUM   PHOSPHORUS   TROPONIN I   TROPONIN I       Radiologic Studies -   XR FOOT RT MIN 3 V   Final Result   No acute fracture demonstrated. Medical Decision Making and ED Course     I have also reviewed the vital signs, available nursing notes, past medical history, past surgical history, family history and social history. Vital Signs - Reviewed the patient's vital signs. No data found. EKG interpretation: (Preliminary): Performed at 1050  Junctional irregular rhythm at a rate of 39; no evidence of P waves throughout EKG. Poor R wave progression left axis        Records Reviewed: Nursing Notes and Old medical records as available. Medical Decision Making/Diff Dx:  Differential diagnosis: Asymptomatic bradycardia, medication excess, new intrinsic conduction delay, new onset bradycardia, electrolyte abnormalities, STEMI, NSTEMI, hypothyroidism    Interesting case of a 79-year-old who presents with asymptomatic bradycardia found incidentally during urgent care visit. Patient does remains bradycardic here in the ED but is sustaining his blood pressure without any additional medications. Unclear etiology for this as he also appears to have a low junctional rhythm with no P waves seen throughout EKG from here or from [facility].    We will do screening labs including mag Kevin,, atropine ordered as needed heart rate less than 30 and plan admission with cardiology consult. ED course/Re-evaluation/Consultations/Other   Patient work-up in the ED findings no significant etiology for his bradycardia. Patient agrees with plan for admission. Procedures     PROCEDURES:  Procedures  N/A    Disposition     Admitted     Diagnosis     Clinical impression:   1. AV junctional bradycardia    2. History of mitral valve replacement    3. Anticoagulated on warfarin

## 2022-01-07 ENCOUNTER — HOSPITAL ENCOUNTER (EMERGENCY)
Age: 67
Discharge: HOME | End: 2022-01-07
Attending: EMERGENCY MEDICINE
Payer: MEDICARE

## 2022-01-07 ENCOUNTER — APPOINTMENT (OUTPATIENT)
Dept: GENERAL RADIOLOGY | Age: 67
End: 2022-01-07
Attending: EMERGENCY MEDICINE
Payer: MEDICARE

## 2022-01-07 VITALS
BODY MASS INDEX: 30.8 KG/M2 | SYSTOLIC BLOOD PRESSURE: 122 MMHG | RESPIRATION RATE: 16 BRPM | HEART RATE: 69 BPM | WEIGHT: 220 LBS | OXYGEN SATURATION: 99 % | HEIGHT: 71 IN | DIASTOLIC BLOOD PRESSURE: 73 MMHG | TEMPERATURE: 98 F

## 2022-01-07 DIAGNOSIS — M25.462 EFFUSION OF LEFT KNEE: ICD-10-CM

## 2022-01-07 DIAGNOSIS — S83.92XA SPRAIN OF LEFT KNEE, UNSPECIFIED LIGAMENT, INITIAL ENCOUNTER: Primary | ICD-10-CM

## 2022-01-07 PROCEDURE — 96372 THER/PROPH/DIAG INJ SC/IM: CPT

## 2022-01-07 PROCEDURE — 74011250636 HC RX REV CODE- 250/636: Performed by: EMERGENCY MEDICINE

## 2022-01-07 PROCEDURE — 99282 EMERGENCY DEPT VISIT SF MDM: CPT

## 2022-01-07 PROCEDURE — 73562 X-RAY EXAM OF KNEE 3: CPT

## 2022-01-07 RX ORDER — KETOROLAC TROMETHAMINE 30 MG/ML
30 INJECTION, SOLUTION INTRAMUSCULAR; INTRAVENOUS
Status: COMPLETED | OUTPATIENT
Start: 2022-01-07 | End: 2022-01-07

## 2022-01-07 RX ORDER — KETOROLAC TROMETHAMINE 10 MG/1
10 TABLET, FILM COATED ORAL
Qty: 20 TABLET | Refills: 0 | Status: SHIPPED | OUTPATIENT
Start: 2022-01-07 | End: 2022-01-12

## 2022-01-07 RX ADMIN — KETOROLAC TROMETHAMINE 30 MG: 30 INJECTION, SOLUTION INTRAMUSCULAR at 11:49

## 2022-01-07 NOTE — Clinical Note
[facility] EMERGENCY DEPARTMENT  [address] [facility] [place] [address]  [phone]    Work/School Note    Date: 1/7/2022    To Whom It May concern:    Zay Cornejo was seen and treated today in the emergency room by the following provider(s):  Attending Provider: Daniel Zuluaga MD.      Zay Cornejo is excused from work/school on 1/7/2022 through 1/9/2022. He is medically clear to return to work/school on 1/10/2022. 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     Sincerely,          Prince Delvalle MD

## 2022-01-07 NOTE — ED PROVIDER NOTES
EMERGENCY DEPARTMENT HISTORY AND PHYSICAL EXAM      Date: 1/7/2022  Patient Name: Sumi Berger    History of Presenting Illness     Chief Complaint   Patient presents with    Knee Pain       History Provided By: Patient    HPI: Sumi Berger, 61 y.o. male with a past medical history significant hypertension presents to the ED with cc of left knee pain x4 days. Patient reports the pain started after slipping on ice and twisting the knee 4 days ago. Patient ports the pain is sharp, worse with palpation or movement, improved with rest.  Patient denies numbness or tingling. There are no other complaints, changes, or physical findings at this time. PCP: Sunny, MD Manda    No current facility-administered medications on file prior to encounter. Current Outpatient Medications on File Prior to Encounter   Medication Sig Dispense Refill    warfarin (Coumadin) 7.5 mg tablet Take 7.5 mg by mouth every Tuesday, Thursday, Saturday & Sunday.  fluticasone propion-salmeteroL (ADVAIR/WIXELA) 250-50 mcg/dose diskus inhaler Take 2 Puffs by inhalation daily.  naproxen (Naprosyn) 500 mg tablet Take 500 mg by mouth daily.  spironolactone (ALDACTONE) 25 mg tablet Take 25 mg by mouth daily.  warfarin (COUMADIN) 1 mg tablet Take 1 tab daily, except 2 tabs on Tuesday. Must make appt, no refills. (Patient taking differently: 2.5 mg every Monday, Wednesday, Friday.) 15 Tab 0    thiamine (B-1) 100 mg tablet Take 1 tablet by mouth daily. For supplementation (Patient not taking: Reported on 8/1/2021) 30 tablet 11    folic acid (FOLVITE) 1 mg tablet Take 1 tablet by mouth daily. For supplementation (Patient not taking: Reported on 8/1/2021) 30 tablet 11    pantoprazole (PROTONIX) 40 mg tablet Take 40 mg by mouth daily. (Patient not taking: Reported on 8/1/2021)      valsartan (DIOVAN) 160 mg tablet Take 1 tablet by mouth daily.  S/p valve replacement (Patient not taking: Reported on 8/1/2021) 90 tablet 1    rosuvastatin (CRESTOR) 10 mg tablet Take 20 mg by mouth nightly.  SUMAtriptan (IMITREX) 100 mg tablet Take 100 mg by mouth once as needed for Migraine. (Patient not taking: Reported on 8/1/2021)      citalopram (CELEXA) 10 mg tablet Take  by mouth as needed. (Patient not taking: Reported on 8/1/2021)      fluticasone (FLONASE) 50 mcg/actuation nasal spray Use two sprays in each nostril once daily for allergies. 1 Bottle 2       Past History     Past Medical History:  Past Medical History:   Diagnosis Date    Aortic valve prosthesis present     Hypertension     Ill-defined condition     Mitral valve replaced 2006       Past Surgical History:  Past Surgical History:   Procedure Laterality Date    HX COLONOSCOPY  10/2014    HX ENDOSCOPY  12/1/2014       Family History:  Family History   Problem Relation Age of Onset    Liver Disease Father        Social History:  Social History     Tobacco Use    Smoking status: Current Every Day Smoker     Packs/day: 1.50     Years: 0.50     Pack years: 0.75    Smokeless tobacco: Never Used   Vaping Use    Vaping Use: Every day   Substance Use Topics    Alcohol use: Yes     Alcohol/week: 4.2 standard drinks     Types: 5 Cans of beer per week    Drug use: No       Allergies:  No Known Allergies      Review of Systems   Review of Systems   Constitutional: Negative for chills and fever. HENT: Negative for sinus pressure and sinus pain. Eyes: Negative for photophobia and redness. Respiratory: Negative for shortness of breath and wheezing. Cardiovascular: Negative for chest pain and palpitations. Gastrointestinal: Negative for abdominal pain and nausea. Genitourinary: Negative for flank pain and hematuria. Musculoskeletal: Positive for left knee pain. Negative for ankle pain or right knee pain. Skin: Negative for color change and pallor. Neurological: Negative for dizziness and weakness.      Review of Systems    Physical Exam   Physical Exam  Constitutional:       General: No acute distress. Appearance: Normal appearance. Not toxic-appearing. HENT:      Head: Normocephalic and atraumatic. Nose: Nose normal.      Mouth/Throat:      Mouth: Mucous membranes are moist.   Eyes:      Extraocular Movements: Extraocular movements intact. Pupils: Pupils are equal, round, and reactive to light. Cardiovascular:      Rate and Rhythm: Normal rate. Pulses: Normal pulses. Pulmonary:      Effort: Pulmonary effort is normal.      Breath sounds: No stridor. Abdominal:      General: Abdomen is flat. There is no distension. Musculoskeletal:         General: Normal range of motion. Lower extremity: Left knee with suprapatellar effusion noted, negative anterior posterior drawer test.  Skin:     General: Skin is warm and dry. Capillary Refill: Capillary refill takes less than 2 seconds. Neurological:      General: No focal deficit present. Mental Status: Aert and oriented to person, place, and time. Psychiatric:         Mood and Affect: Mood normal.         Behavior: Behavior normal.     Physical Exam    Lab and Diagnostic Study Results     Labs -   No results found for this or any previous visit (from the past 12 hour(s)). Radiologic Studies -   @lastxrresult@  CT Results  (Last 48 hours)    None        CXR Results  (Last 48 hours)    None            Medical Decision Making   - I am the first provider for this patient. - I reviewed the vital signs, available nursing notes, past medical history, past surgical history, family history and social history. - Initial assessment performed. The patients presenting problems have been discussed, and they are in agreement with the care plan formulated and outlined with them. I have encouraged them to ask questions as they arise throughout their visit. Vital Signs-Reviewed the patient's vital signs.   Patient Vitals for the past 12 hrs:   Temp Pulse Resp BP SpO2 01/07/22 1053 98 °F (36.7 °C) 69 16 122/73 99 %          Provider Notes (Medical Decision Making):   Knee stable, effusion noted. We will continue to use rest, ice, compression and nonsteroidal anti-inflammatories for treatment. Discussed need for orthopedics follow-up. [facility]           Disposition   Disposition: DC- Adult Discharges: All of the diagnostic tests were reviewed and questions answered. Diagnosis, care plan and treatment options were discussed. The patient understands the instructions and will follow up as directed. The patients results have been reviewed with them. They have been counseled regarding their diagnosis. The patient verbally convey understanding and agreement of the signs, symptoms, diagnosis, treatment and prognosis and additionally agrees to follow up as recommended with their PCP in 24 - 48 hours. They also agree with the care-plan and convey that all of their questions have been answered. I have also put together some discharge instructions for them that include: 1) educational information regarding their diagnosis, 2) how to care for their diagnosis at home, as well a 3) list of reasons why they would want to return to the ED prior to their follow-up appointment, should their condition change. DISCHARGE PLAN:  1. Current Discharge Medication List      CONTINUE these medications which have NOT CHANGED    Details   !! warfarin (Coumadin) 7.5 mg tablet Take 7.5 mg by mouth every Tuesday, Thursday, Saturday & Sunday. fluticasone propion-salmeteroL (ADVAIR/WIXELA) 250-50 mcg/dose diskus inhaler Take 2 Puffs by inhalation daily. naproxen (Naprosyn) 500 mg tablet Take 500 mg by mouth daily. spironolactone (ALDACTONE) 25 mg tablet Take 25 mg by mouth daily. !! warfarin (COUMADIN) 1 mg tablet Take 1 tab daily, except 2 tabs on Tuesday. Must make appt, no refills. Qty: 15 Tab, Refills: 0      thiamine (B-1) 100 mg tablet Take 1 tablet by mouth daily.  For supplementation  Qty: 30 tablet, Refills: 11    Associated Diagnoses: Alcoholism /alcohol abuse      folic acid (FOLVITE) 1 mg tablet Take 1 tablet by mouth daily. For supplementation  Qty: 30 tablet, Refills: 11    Associated Diagnoses: Alcoholism /alcohol abuse      pantoprazole (PROTONIX) 40 mg tablet Take 40 mg by mouth daily. valsartan (DIOVAN) 160 mg tablet Take 1 tablet by mouth daily. S/p valve replacement  Qty: 90 tablet, Refills: 1    Associated Diagnoses: H/O mitral valve replacement      rosuvastatin (CRESTOR) 10 mg tablet Take 20 mg by mouth nightly. SUMAtriptan (IMITREX) 100 mg tablet Take 100 mg by mouth once as needed for Migraine. citalopram (CELEXA) 10 mg tablet Take  by mouth as needed. fluticasone (FLONASE) 50 mcg/actuation nasal spray Use two sprays in each nostril once daily for allergies. Qty: 1 Bottle, Refills: 2    Associated Diagnoses: Allergic rhinitis       ! ! - Potential duplicate medications found. Please discuss with provider. 2.   Follow-up Information    None       3. Return to ED if worse   4. Current Discharge Medication List            Diagnosis     Clinical Impression:   1. Sprain of left knee, unspecified ligament, initial encounter    2. Effusion of left knee        Attestations:    Jennifer Kim MD    Please note that this dictation was completed with [employer], the [employer] voice recognition software. Quite often unanticipated grammatical, syntax, homophones, and other interpretive errors are inadvertently transcribed by the computer software. Please disregard these errors. Please excuse any errors that have escaped final proofreading. Thank you.

## 2022-01-07 NOTE — DISCHARGE INSTRUCTIONS
Thank you! Thank you for allowing me to care for you in the emergency department. I sincerely hope that you are satisfied with your visit today. It is my goal to provide you with excellent care. Below you will find a list of your labs and imaging from your visit today. Should you have any questions regarding these results please do not hesitate to call the emergency department. Labs -   No results found for this or any previous visit (from the past 12 hour(s)). Radiologic Studies -   XR KNEE LT 3 V   Final Result   No acute osseous abnormality identified. Suprapatellar joint   effusion. CT Results  (Last 48 hours)      None          CXR Results  (Last 48 hours)      None               If you feel that you have not received excellent quality care or timely care, please ask to speak to the nurse manager. Please choose us in the future for your continued health care needs. ------------------------------------------------------------------------------------------------------------  The exam and treatment you received in the Emergency Department were for an urgent problem and are not intended as complete care. It is important that you follow-up with a doctor, nurse practitioner, or physician assistant to:  (1) confirm your diagnosis,  (2) re-evaluation of changes in your illness and treatment, and  (3) for ongoing care. If your symptoms become worse or you do not improve as expected and you are unable to reach your usual health care provider, you should return to the Emergency Department. We are available 24 hours a day. Please take your discharge instructions with you when you go to your follow-up appointment. If you have any problem arranging a follow-up appointment, contact the Emergency Department immediately. If a prescription has been provided, please have it filled as soon as possible to prevent a delay in treatment.  Read the entire medication instruction sheet provided to you by the pharmacy. If you have any questions or reservations about taking the medication due to side effects or interactions with other medications, please call your primary care physician or contact the ER to speak with the charge nurse. Make an appointment with your family doctor or the physician you were referred to for follow-up of this visit as instructed on your discharge paperwork, as this is a mandatory follow-up. Return to the ER if you are unable to be seen or if you are unable to be seen in a timely manner. If you have any problem arranging the follow-up visit, contact the Emergency Department immediately.

## 2022-03-03 ENCOUNTER — HOSPITAL ENCOUNTER (EMERGENCY)
Age: 67
Discharge: HOME | End: 2022-03-03
Attending: FAMILY MEDICINE
Payer: COMMERCIAL

## 2022-03-03 VITALS — BODY MASS INDEX: 30.8 KG/M2 | HEIGHT: 71 IN | WEIGHT: 220 LBS | TEMPERATURE: 97.5 F

## 2022-03-03 DIAGNOSIS — M62.838 TRAPEZIUS MUSCLE SPASM: Primary | ICD-10-CM

## 2022-03-03 PROCEDURE — 99283 EMERGENCY DEPT VISIT LOW MDM: CPT

## 2022-03-03 RX ORDER — CYCLOBENZAPRINE HCL 10 MG
10 TABLET ORAL
Qty: 15 TABLET | Refills: 0 | Status: SHIPPED | OUTPATIENT
Start: 2022-03-03 | End: 2022-03-08

## 2022-03-03 NOTE — ED PROVIDER NOTES
EMERGENCY DEPARTMENT HISTORY AND PHYSICAL EXAM      Date: 3/3/2022  Patient Name: Angus Bennett    History of Presenting Illness     Chief Complaint   Patient presents with    Neck Pain       History Provided By:     HPI: Angus Bennett, is an extremely pleasant 61 y.o. male presenting to the ED with a chief complaint of neck pain. Pt states he bought a new pillow and since this time hes developed R neck muscle pain. It feels achy. Worse with movement. Dana neck stifnness nor headache. No fever     There are no other complaints, changes, or physical findings at this time. PCP: Sunny, MD Manda    No current facility-administered medications on file prior to encounter. Current Outpatient Medications on File Prior to Encounter   Medication Sig Dispense Refill    warfarin (Coumadin) 7.5 mg tablet Take 7.5 mg by mouth every Tuesday, Thursday, Saturday & Sunday.  fluticasone propion-salmeteroL (ADVAIR/WIXELA) 250-50 mcg/dose diskus inhaler Take 2 Puffs by inhalation daily.  naproxen (Naprosyn) 500 mg tablet Take 500 mg by mouth daily.  spironolactone (ALDACTONE) 25 mg tablet Take 25 mg by mouth daily.  warfarin (COUMADIN) 1 mg tablet Take 1 tab daily, except 2 tabs on Tuesday. Must make appt, no refills. (Patient taking differently: 2.5 mg every Monday, Wednesday, Friday.) 15 Tab 0    thiamine (B-1) 100 mg tablet Take 1 tablet by mouth daily. For supplementation (Patient not taking: Reported on 8/1/2021) 30 tablet 11    folic acid (FOLVITE) 1 mg tablet Take 1 tablet by mouth daily. For supplementation (Patient not taking: Reported on 8/1/2021) 30 tablet 11    pantoprazole (PROTONIX) 40 mg tablet Take 40 mg by mouth daily. (Patient not taking: Reported on 8/1/2021)      valsartan (DIOVAN) 160 mg tablet Take 1 tablet by mouth daily. S/p valve replacement (Patient not taking: Reported on 8/1/2021) 90 tablet 1    rosuvastatin (CRESTOR) 10 mg tablet Take 20 mg by mouth nightly.       SUMAtriptan (IMITREX) 100 mg tablet Take 100 mg by mouth once as needed for Migraine. (Patient not taking: Reported on 8/1/2021)      citalopram (CELEXA) 10 mg tablet Take  by mouth as needed. (Patient not taking: Reported on 8/1/2021)      fluticasone (FLONASE) 50 mcg/actuation nasal spray Use two sprays in each nostril once daily for allergies. 1 Bottle 2       Past History     Past Medical History:  Past Medical History:   Diagnosis Date    Aortic valve prosthesis present     Hypertension     Ill-defined condition     Mitral valve replaced 2006       Past Surgical History:  Past Surgical History:   Procedure Laterality Date    HX COLONOSCOPY  10/2014    HX ENDOSCOPY  12/1/2014       Family History:  Family History   Problem Relation Age of Onset    Liver Disease Father        Social History:  Social History     Tobacco Use    Smoking status: Current Every Day Smoker     Packs/day: 1.50     Years: 0.50     Pack years: 0.75    Smokeless tobacco: Never Used   Vaping Use    Vaping Use: Every day   Substance Use Topics    Alcohol use: Yes     Alcohol/week: 4.2 standard drinks     Types: 5 Cans of beer per week    Drug use: No       Allergies:  No Known Allergies      Review of Systems     Review of Systems   Constitutional: Negative for activity change, appetite change, chills, fatigue and fever. HENT: Negative for congestion and sore throat. Eyes: Negative for photophobia and visual disturbance. Respiratory: Negative for cough, shortness of breath and wheezing. Cardiovascular: Negative for chest pain, palpitations and leg swelling. Gastrointestinal: Negative for abdominal pain, diarrhea, nausea and vomiting. Endocrine: Negative for cold intolerance and heat intolerance. Musculoskeletal: Positive for neck pain. Negative for gait problem and joint swelling. Skin: Negative for color change and rash. Neurological: Negative for dizziness and headaches.        Physical Exam     Physical Exam  Constitutional:       General: He is not in acute distress. Appearance: Normal appearance. He is not toxic-appearing. HENT:      Head: Normocephalic and atraumatic. Right Ear: External ear normal.      Left Ear: External ear normal.      Mouth/Throat:      Mouth: Mucous membranes are moist.      Pharynx: Oropharynx is clear. Eyes:      Extraocular Movements: Extraocular movements intact. Conjunctiva/sclera: Conjunctivae normal.   Cardiovascular:      Rate and Rhythm: Normal rate and regular rhythm. Pulses: Normal pulses. Heart sounds: Normal heart sounds. Pulmonary:      Effort: Pulmonary effort is normal. No respiratory distress. Breath sounds: Normal breath sounds. No wheezing, rhonchi or rales. Abdominal:      General: There is no distension. Palpations: Abdomen is soft. Tenderness: There is no abdominal tenderness. There is no guarding or rebound. Musculoskeletal:         General: No deformity. Cervical back: Normal range of motion and neck supple. Right lower leg: No edema. Left lower leg: No edema. Comments: No midline neck pain. Right trapezius muscle spasm   Skin:     Capillary Refill: Capillary refill takes less than 2 seconds. Findings: No erythema or rash. Neurological:      General: No focal deficit present. Mental Status: He is alert and oriented to person, place, and time. Gait: Gait normal.   Psychiatric:         Mood and Affect: Mood normal.         Behavior: Behavior normal.         Lab and Diagnostic Study Results     Labs -   No results found for this or any previous visit (from the past 12 hour(s)). Radiologic Studies -   @lastxrresult@  CT Results  (Last 48 hours)    None        CXR Results  (Last 48 hours)    None            Medical Decision Making   - I am the first provider for this patient.   - I reviewed the vital signs, available nursing notes, past medical history, past surgical history, family history and social history. - Initial assessment performed. The patients presenting problems have been discussed, and they are in agreement with the care plan formulated and outlined with them. I have encouraged them to ask questions as they arise throughout their visit. Vital Signs-Reviewed the patient's vital signs. Patient Vitals for the past 12 hrs:   Temp   03/03/22 1101 97.5 °F (36.4 °C)         ED Course/ Provider Notes (Medical Decision Making):     Patient presented to the emergency department with a chief complaint of  Neck pain. On examination the patient is nontoxic and well-appearing. Vitals were reviewed per above. No meningismus, fevers, neck stiffness. No midline tenderness. Discussed supportive measures for his likely trapezius muscle spasm. Alane Goldmann was given a thorough list of signs and symptoms that would warrant an immediate return to the emergency department. Otherwise Alane Goldmann will follow up with PCP. Procedures   Medical Decision Makingedical Decision Making  Performed by: Warden Nir DO  Procedures  None       Disposition   Disposition:     Home     All of the diagnostic tests were reviewed and questions answered. Diagnosis, care plan and treatment options were discussed. The patient understands the instructions and will follow up as directed. The patients results have been reviewed with them. They have been counseled regarding their diagnosis. The patient verbally convey understanding and agreement of the signs, symptoms, diagnosis, treatment and prognosis and additionally agrees to follow up as recommended with their PCP in 24 - 48 hours. They also agree with the care-plan and convey that all of their questions have been answered.   I have also put together some discharge instructions for them that include: 1) educational information regarding their diagnosis, 2) how to care for their diagnosis at home, as well a 3) list of reasons why they would want to return to the ED prior to their follow-up appointment, should their condition change. DISCHARGE PLAN:    1. Cannot display discharge medications since this patient is not currently admitted. 2.   Follow-up Information     Follow up With Specialties Details Why Contact Info    Your primary care doctor  Schedule an appointment as soon as possible for a visit in 2 days              3.  Return to ED if worse       4. Discharge Medication List as of 3/3/2022 11:39 AM      START taking these medications    Details   cyclobenzaprine (FLEXERIL) 10 mg tablet Take 1 Tablet by mouth three (3) times daily as needed for Muscle Spasm(s) for up to 5 days. , Normal, Disp-15 Tablet, R-0         CONTINUE these medications which have NOT CHANGED    Details   !! warfarin (Coumadin) 7.5 mg tablet Take 7.5 mg by mouth every Tuesday, Thursday, Saturday & Sunday., Historical Med      fluticasone propion-salmeteroL (ADVAIR/WIXELA) 250-50 mcg/dose diskus inhaler Take 2 Puffs by inhalation daily. , Historical Med      naproxen (Naprosyn) 500 mg tablet Take 500 mg by mouth daily. , Historical Med      spironolactone (ALDACTONE) 25 mg tablet Take 25 mg by mouth daily. , Historical Med      !! warfarin (COUMADIN) 1 mg tablet Take 1 tab daily, except 2 tabs on Tuesday. Must make appt, no refills. , Normal, Disp-15 Tab, R-0      thiamine (B-1) 100 mg tablet Take 1 tablet by mouth daily. For supplementation, Normal, Disp-30 tablet, W-28      folic acid (FOLVITE) 1 mg tablet Take 1 tablet by mouth daily. For supplementation, Normal, Disp-30 tablet, R-11      pantoprazole (PROTONIX) 40 mg tablet Take 40 mg by mouth daily. , Historical Med      valsartan (DIOVAN) 160 mg tablet Take 1 tablet by mouth daily. S/p valve replacement, No Print, Disp-90 tablet, R-1      rosuvastatin (CRESTOR) 10 mg tablet Take 20 mg by mouth nightly., Historical Med      SUMAtriptan (IMITREX) 100 mg tablet Take 100 mg by mouth once as needed for Migraine. , Historical Med      citalopram (CELEXA) 10 mg tablet Take  by mouth as needed., Historical Med      fluticasone (FLONASE) 50 mcg/actuation nasal spray Use two sprays in each nostril once daily for allergies. , Normal, Disp-1 Bottle, R-2       !! - Potential duplicate medications found. Please discuss with provider. Diagnosis     Clinical Impression:    1. Trapezius muscle spasm        Attestations:    Valeriano Love, DO    Please note that this dictation was completed with [employer], the computer voice recognition software. Quite often unanticipated grammatical, syntax, homophones, and other interpretive errors are inadvertently transcribed by the computer software. Please disregard these errors. Please excuse any errors that have escaped final proofreading. Thank you.

## 2022-03-20 PROBLEM — R00.1 BRADYCARDIA: Status: ACTIVE | Noted: 2021-07-31

## 2023-05-15 RX ORDER — NAPROXEN 500 MG/1
500 TABLET ORAL DAILY
COMMUNITY

## 2023-05-15 RX ORDER — ROSUVASTATIN CALCIUM 10 MG/1
20 TABLET, COATED ORAL NIGHTLY
COMMUNITY

## 2023-05-15 RX ORDER — LANOLIN ALCOHOL/MO/W.PET/CERES
100 CREAM (GRAM) TOPICAL DAILY
COMMUNITY
Start: 2015-01-12

## 2023-05-15 RX ORDER — SUMATRIPTAN 100 MG/1
100 TABLET, FILM COATED ORAL
COMMUNITY

## 2023-05-15 RX ORDER — PANTOPRAZOLE SODIUM 40 MG/1
40 TABLET, DELAYED RELEASE ORAL DAILY
COMMUNITY

## 2023-05-15 RX ORDER — VALSARTAN 160 MG/1
160 TABLET ORAL DAILY
COMMUNITY
Start: 2015-01-02

## 2023-05-15 RX ORDER — WARFARIN SODIUM 1 MG/1
TABLET ORAL
COMMUNITY
Start: 2015-07-08

## 2023-05-15 RX ORDER — WARFARIN SODIUM 7.5 MG/1
7.5 TABLET ORAL
COMMUNITY

## 2023-05-15 RX ORDER — CITALOPRAM 10 MG/1
TABLET ORAL PRN
COMMUNITY

## 2023-05-15 RX ORDER — FLUTICASONE PROPIONATE 50 MCG
SPRAY, SUSPENSION (ML) NASAL
COMMUNITY
Start: 2014-12-30

## 2023-05-15 RX ORDER — FOLIC ACID 1 MG/1
1 TABLET ORAL DAILY
COMMUNITY
Start: 2015-01-12

## 2023-05-15 RX ORDER — SPIRONOLACTONE 25 MG/1
25 TABLET ORAL DAILY
COMMUNITY